# Patient Record
Sex: FEMALE | Employment: FULL TIME | URBAN - METROPOLITAN AREA
[De-identification: names, ages, dates, MRNs, and addresses within clinical notes are randomized per-mention and may not be internally consistent; named-entity substitution may affect disease eponyms.]

---

## 2017-05-09 ENCOUNTER — TELEPHONE (OUTPATIENT)
Dept: OBGYN CLINIC | Facility: CLINIC | Age: 37
End: 2017-05-09

## 2017-05-09 NOTE — TELEPHONE ENCOUNTER
Received signed form from Pt to get Medical Records sent to [facility] AT [facility] for Pt to ask where we are getting records from  Left form on  in [place]

## 2017-05-15 ENCOUNTER — OFFICE VISIT (OUTPATIENT)
Dept: OBGYN CLINIC | Facility: CLINIC | Age: 37
End: 2017-05-15

## 2017-05-15 VITALS
BODY MASS INDEX: 24.22 KG/M2 | HEIGHT: 60.25 IN | DIASTOLIC BLOOD PRESSURE: 70 MMHG | SYSTOLIC BLOOD PRESSURE: 106 MMHG | WEIGHT: 125 LBS

## 2017-05-15 DIAGNOSIS — Z12.4 CERVICAL CANCER SCREENING: ICD-10-CM

## 2017-05-15 DIAGNOSIS — R10.2 PELVIC PAIN: ICD-10-CM

## 2017-05-15 DIAGNOSIS — R63.8 CRAVING FOR PARTICULAR FOOD: ICD-10-CM

## 2017-05-15 DIAGNOSIS — Z01.419 WELL WOMAN EXAM WITH ROUTINE GYNECOLOGICAL EXAM: Primary | ICD-10-CM

## 2017-05-15 PROCEDURE — 58301 REMOVE INTRAUTERINE DEVICE: CPT | Performed by: NURSE PRACTITIONER

## 2017-05-15 PROCEDURE — 88175 CYTOPATH C/V AUTO FLUID REDO: CPT | Performed by: NURSE PRACTITIONER

## 2017-05-15 PROCEDURE — 99385 PREV VISIT NEW AGE 18-39: CPT | Performed by: NURSE PRACTITIONER

## 2017-05-15 PROCEDURE — 87624 HPV HI-RISK TYP POOLED RSLT: CPT | Performed by: NURSE PRACTITIONER

## 2017-05-15 RX ORDER — ESCITALOPRAM OXALATE 20 MG/1
20 TABLET ORAL DAILY
COMMUNITY

## 2017-05-15 RX ORDER — LORAZEPAM 0.5 MG/1
TABLET ORAL
Refills: 1 | COMMUNITY
Start: 2017-03-24

## 2017-05-15 RX ORDER — LEVONORGESTREL / ETHINYL ESTRADIOL AND ETHINYL ESTRADIOL 150-30(84)
1 KIT ORAL DAILY
Qty: 1 PACKAGE | Refills: 4 | Status: SHIPPED | OUTPATIENT
Start: 2017-05-15 | End: 2018-06-17

## 2017-05-15 NOTE — PROGRESS NOTES
A/P  Contraception    Seasonique 1 pill PO QD Disp 91 with 3 refills  Condoms for 1 month  Reviewed risk for VTE, patient is a non- smoker

## 2017-05-15 NOTE — PROGRESS NOTES
Here for new gynecology visit. 43year old G 0 P 0. Patient's last menstrual period was 05/14/2017 (exact date). .     Here for Annual Gynecologic Exam. She also recounts daily and frequent pain since IUD inserted 2 years ago.  Would like IUD removed, samuel No tenderness, masses, secretions, or adenopathy. ABDOMEN:  Soft and non tender. No organomegaly. No inguinal adenopathy. VULVA:  Without lesions or erythema. VAGINA:  Pink, moist without lesions. Small blood in vault  CERVIX:  Without lesions or CMT.

## 2017-07-14 ENCOUNTER — MED REC SCAN ONLY (OUTPATIENT)
Dept: OBGYN CLINIC | Facility: CLINIC | Age: 37
End: 2017-07-14

## 2018-06-18 NOTE — TELEPHONE ENCOUNTER
Last OV: 5/15/17 with Orquidea Bernal for annual  Last refill date: 5/15/17  Follow-up: 1 year  Next appt.: none scheduled; due for annual    Patient due for annual. Please contact her to schedule appt and then return to RN pool for refill.  Thank you

## 2018-06-18 NOTE — TELEPHONE ENCOUNTER
Left msg for pt to call and schedule minh  Advised once scheduled we can send in refill to get her thru to her appointment date   Provided call back number

## 2018-06-19 RX ORDER — LEVONORGESTREL AND ETHINYL ESTRADIOL 150-30(84)
1 KIT ORAL DAILY
Qty: 91 TABLET | Refills: 0 | Status: SHIPPED | OUTPATIENT
Start: 2018-06-19 | End: 2018-08-13

## 2018-08-13 ENCOUNTER — OFFICE VISIT (OUTPATIENT)
Dept: OBGYN CLINIC | Facility: CLINIC | Age: 38
End: 2018-08-13
Payer: COMMERCIAL

## 2018-08-13 VITALS
BODY MASS INDEX: 28.43 KG/M2 | WEIGHT: 144.81 LBS | HEIGHT: 60 IN | SYSTOLIC BLOOD PRESSURE: 108 MMHG | HEART RATE: 64 BPM | DIASTOLIC BLOOD PRESSURE: 70 MMHG

## 2018-08-13 DIAGNOSIS — Z12.39 BREAST CANCER SCREENING: ICD-10-CM

## 2018-08-13 DIAGNOSIS — Z30.41 SURVEILLANCE FOR BIRTH CONTROL, ORAL CONTRACEPTIVES: ICD-10-CM

## 2018-08-13 DIAGNOSIS — Z01.419 WELL WOMAN EXAM WITH ROUTINE GYNECOLOGICAL EXAM: Primary | ICD-10-CM

## 2018-08-13 PROCEDURE — 99396 PREV VISIT EST AGE 40-64: CPT | Performed by: NURSE PRACTITIONER

## 2018-08-13 RX ORDER — LAMOTRIGINE 200 MG/1
100 TABLET ORAL
COMMUNITY
Start: 2018-08-10

## 2018-08-13 RX ORDER — LEVONORGESTREL / ETHINYL ESTRADIOL AND ETHINYL ESTRADIOL 150-30(84)
1 KIT ORAL DAILY
Qty: 91 TABLET | Refills: 4 | Status: SHIPPED | OUTPATIENT
Start: 2018-08-13 | End: 2018-11-12

## 2018-08-13 NOTE — PROGRESS NOTES
Here for Routine Annual Exam  No concerns or questions. Menses are every quarter, no concern. Contraception- OCP. No C/O, has had substantial weight gain. Has had a lot of stress.  Psych has changed her medications a few times and did go through a pe

## (undated) NOTE — MR AVS SNAPSHOT
[facility]  [address], [facility] [address] [address]  [phone]               Thank you for choosing us for your health care visit with RICHARD Sharma.   We are glad to serve you and happy to provide you with this sum [employer] for particular food          Instructions and Information about Your Health     None      Allergies as of May 15, 2017     Not on File                Today's Vital Signs     BP Height Weight BMI Breastfeeding? 106/70 mmHg 60.25\" 125 lb 24. 2